# Patient Record
Sex: MALE | Race: BLACK OR AFRICAN AMERICAN | Employment: UNEMPLOYED | URBAN - METROPOLITAN AREA
[De-identification: names, ages, dates, MRNs, and addresses within clinical notes are randomized per-mention and may not be internally consistent; named-entity substitution may affect disease eponyms.]

---

## 2023-02-02 ENCOUNTER — HOSPITAL ENCOUNTER (EMERGENCY)
Age: 28
Discharge: HOME | End: 2023-02-02
Attending: EMERGENCY MEDICINE

## 2023-02-02 ENCOUNTER — APPOINTMENT (OUTPATIENT)
Dept: CT IMAGING | Age: 28
End: 2023-02-02

## 2023-02-02 VITALS
DIASTOLIC BLOOD PRESSURE: 93 MMHG | TEMPERATURE: 97.7 F | HEIGHT: 66 IN | BODY MASS INDEX: 27.64 KG/M2 | RESPIRATION RATE: 18 BRPM | SYSTOLIC BLOOD PRESSURE: 126 MMHG | OXYGEN SATURATION: 100 % | HEART RATE: 74 BPM | WEIGHT: 172 LBS

## 2023-02-02 DIAGNOSIS — F41.9 ANXIETY: Primary | ICD-10-CM

## 2023-02-02 LAB
ALBUMIN SERPL-MCNC: 4.2 G/DL (ref 3.5–5)
ALBUMIN/GLOB SERPL: 1 (ref 0.4–1.6)
ALP SERPL-CCNC: 69 U/L (ref 50–136)
ALT SERPL-CCNC: 22 U/L (ref 12–65)
AMPHET UR QL SCN: NEGATIVE
ANION GAP SERPL CALC-SCNC: 7 MMOL/L (ref 2–11)
APAP SERPL-MCNC: <2 UG/ML (ref 10–30)
AST SERPL-CCNC: 30 U/L (ref 15–37)
BARBITURATES UR QL SCN: NEGATIVE
BASOPHILS # BLD: 0 K/UL (ref 0–0.2)
BASOPHILS NFR BLD: 1 % (ref 0–2)
BENZODIAZ UR QL: NEGATIVE
BILIRUB SERPL-MCNC: 0.4 MG/DL (ref 0.2–1.1)
BUN SERPL-MCNC: 8 MG/DL (ref 6–23)
CALCIUM SERPL-MCNC: 9.3 MG/DL (ref 8.3–10.4)
CANNABINOIDS UR QL SCN: POSITIVE
CHLORIDE SERPL-SCNC: 109 MMOL/L (ref 101–110)
CK SERPL-CCNC: 211 U/L (ref 21–215)
CO2 SERPL-SCNC: 27 MMOL/L (ref 21–32)
COCAINE UR QL SCN: NEGATIVE
CREAT SERPL-MCNC: 1 MG/DL (ref 0.8–1.5)
DIFFERENTIAL METHOD BLD: ABNORMAL
EKG ATRIAL RATE: 78 BPM
EKG DIAGNOSIS: NORMAL
EKG P AXIS: 27 DEGREES
EKG P-R INTERVAL: 154 MS
EKG Q-T INTERVAL: 354 MS
EKG QRS DURATION: 92 MS
EKG QTC CALCULATION (BAZETT): 403 MS
EKG R AXIS: 42 DEGREES
EKG T AXIS: 25 DEGREES
EKG VENTRICULAR RATE: 78 BPM
EOSINOPHIL # BLD: 0.1 K/UL (ref 0–0.8)
EOSINOPHIL NFR BLD: 2 % (ref 0.5–7.8)
ERYTHROCYTE [DISTWIDTH] IN BLOOD BY AUTOMATED COUNT: 13.7 % (ref 11.9–14.6)
ETHANOL SERPL-MCNC: 119 MG/DL (ref 0–0.08)
GLOBULIN SER CALC-MCNC: 4.1 G/DL (ref 2.8–4.5)
GLUCOSE SERPL-MCNC: 84 MG/DL (ref 65–100)
HCT VFR BLD AUTO: 42.4 % (ref 41.1–50.3)
HGB BLD-MCNC: 14 G/DL (ref 13.6–17.2)
IMM GRANULOCYTES # BLD AUTO: 0 K/UL (ref 0–0.5)
IMM GRANULOCYTES NFR BLD AUTO: 0 % (ref 0–5)
LYMPHOCYTES # BLD: 2.8 K/UL (ref 0.5–4.6)
LYMPHOCYTES NFR BLD: 50 % (ref 13–44)
MAGNESIUM SERPL-MCNC: 2.3 MG/DL (ref 1.8–2.4)
MCH RBC QN AUTO: 30.4 PG (ref 26.1–32.9)
MCHC RBC AUTO-ENTMCNC: 33 G/DL (ref 31.4–35)
MCV RBC AUTO: 92 FL (ref 82–102)
METHADONE UR QL: NEGATIVE
MONOCYTES # BLD: 0.4 K/UL (ref 0.1–1.3)
MONOCYTES NFR BLD: 7 % (ref 4–12)
NEUTS SEG # BLD: 2.3 K/UL (ref 1.7–8.2)
NEUTS SEG NFR BLD: 40 % (ref 43–78)
NRBC # BLD: 0 K/UL (ref 0–0.2)
OPIATES UR QL: NEGATIVE
PCP UR QL: NEGATIVE
PLATELET # BLD AUTO: 302 K/UL (ref 150–450)
PMV BLD AUTO: 9.6 FL (ref 9.4–12.3)
POTASSIUM SERPL-SCNC: 4.3 MMOL/L (ref 3.5–5.1)
PROT SERPL-MCNC: 8.3 G/DL (ref 6.3–8.2)
RBC # BLD AUTO: 4.61 M/UL (ref 4.23–5.6)
SALICYLATES SERPL-MCNC: 3.2 MG/DL (ref 2.8–20)
SODIUM SERPL-SCNC: 143 MMOL/L (ref 133–143)
WBC # BLD AUTO: 5.7 K/UL (ref 4.3–11.1)

## 2023-02-02 PROCEDURE — 72125 CT NECK SPINE W/O DYE: CPT

## 2023-02-02 PROCEDURE — 82077 ASSAY SPEC XCP UR&BREATH IA: CPT

## 2023-02-02 PROCEDURE — 80307 DRUG TEST PRSMV CHEM ANLYZR: CPT

## 2023-02-02 PROCEDURE — 80143 DRUG ASSAY ACETAMINOPHEN: CPT

## 2023-02-02 PROCEDURE — 93005 ELECTROCARDIOGRAM TRACING: CPT

## 2023-02-02 PROCEDURE — 80053 COMPREHEN METABOLIC PANEL: CPT

## 2023-02-02 PROCEDURE — 80179 DRUG ASSAY SALICYLATE: CPT

## 2023-02-02 PROCEDURE — 83735 ASSAY OF MAGNESIUM: CPT

## 2023-02-02 PROCEDURE — 82550 ASSAY OF CK (CPK): CPT

## 2023-02-02 PROCEDURE — 85025 COMPLETE CBC W/AUTO DIFF WBC: CPT

## 2023-02-02 PROCEDURE — 70450 CT HEAD/BRAIN W/O DYE: CPT

## 2023-02-02 PROCEDURE — 99284 EMERGENCY DEPT VISIT MOD MDM: CPT

## 2023-02-02 RX ORDER — HYDROXYZINE HYDROCHLORIDE 25 MG/1
25 TABLET, FILM COATED ORAL EVERY 8 HOURS PRN
Qty: 10 TABLET | Refills: 0 | Status: SHIPPED | OUTPATIENT
Start: 2023-02-02 | End: 2023-02-12

## 2023-02-02 RX ORDER — HYDROXYZINE HYDROCHLORIDE 25 MG/1
25 TABLET, FILM COATED ORAL EVERY 8 HOURS PRN
Qty: 10 TABLET | Refills: 0 | Status: SHIPPED | OUTPATIENT
Start: 2023-02-02 | End: 2023-02-02 | Stop reason: SDUPTHER

## 2023-02-02 ASSESSMENT — PAIN - FUNCTIONAL ASSESSMENT: PAIN_FUNCTIONAL_ASSESSMENT: 0-10

## 2023-02-02 ASSESSMENT — LIFESTYLE VARIABLES
HOW OFTEN DO YOU HAVE A DRINK CONTAINING ALCOHOL: 4 OR MORE TIMES A WEEK
HOW MANY STANDARD DRINKS CONTAINING ALCOHOL DO YOU HAVE ON A TYPICAL DAY: 5 OR 6

## 2023-02-02 ASSESSMENT — PAIN SCALES - GENERAL: PAINLEVEL_OUTOF10: 0

## 2023-02-02 NOTE — ED PROVIDER NOTES
Emergency Department Provider Note                   PCP:                None None               Age: 32 y.o. Sex: male       ICD-10-CM    1. Anxiety  F41.9           DISPOSITION Decision To Discharge 02/02/2023 01:54:58 PM       Medical Decision Making  This patient is a 32year old male with no significant past medical history who presents today due to anxiety that occured yesterday when he was upset and subsequently passing out while unconscious and hitting his head outside. Differential diagnosis considered includes but is not limited to acute intracranial bleed, arrhythmia, anxiety. The patient is a well-appearing 43-year-old male who presents with his girlfriend. He says he has a mild frontal headache but denies all other symptoms at this time including anxiety. His headache was a 7/10 in severity but he denies needing pain medication. The patient's neurologic exam reveals no acute deficit. CT of his head and neck show no acute pathology. CBC and CMP are unremarkable. UDS is only positive for THC. EKG shows normal sinus rhythm. I discussed these results with the patient and his girlfriend. I gave the patient a prescription for Atarax to be used PRN for anxiety and stressed the importance of following up with either primary care or psychiatry for further evaluation and treatment of this patient's anxiety. The patient says that he is feeling better and is ready to go home on re-evaluation. We discussed return precautions. The patient and his girlfriend verbalize understanding and agreement with the plan. Problems Addressed:  Anxiety: acute illness or injury    Amount and/or Complexity of Data Reviewed  Labs: ordered. Radiology: ordered. ECG/medicine tests: ordered. Risk  Prescription drug management. Complexity of Problem: 1 stable, acute illness. (3)  The patients assessment required an independent historian: I spoke with a friend.   I have conducted an independent ordering and review of Labs. ED Course as of 02/03/23 2323   Thu Feb 02, 2023   1338 CT head IMPRESSION:  No acute intracranial abnormality. [KS]   2597    CT C spine IMPRESSION:  No acute traumatic abnormality of the cervical spine. [KS]   1339 EKG  Rate 78 bpm  No st segment elevation  Normal sinus rhythm [KS]   1344 UDS positive for THC. [KS]   5471 On reevaluation, the patient says that he does not feel anxious. He is ready to go home. We discussed the importance of following up with primary care in order to possibly be started on long-term anxiety medication we did. We also discussed possibly using hydroxyzine in the meantime in order to help with his panic attacks. [KS]      ED Course User Index  [KS] BERTRAND Morales        Orders Placed This Encounter   Procedures    CT HEAD WO CONTRAST    CT CERVICAL SPINE WO CONTRAST    CBC with Auto Differential    CMP    Acetaminophen Level    ETOH    Magnesium    Urine Drug Screen    CK    Salicylate    POCT Urine Dipstick    EKG 12 Lead        Medications - No data to display    Discharge Medication List as of 2/2/2023  2:19 PM        START taking these medications    Details   hydrOXYzine HCl (ATARAX) 25 MG tablet Take 1 tablet by mouth every 8 hours as needed for Itching, Disp-10 tablet, R-0Print              David Zapata is a 32 y.o. male who presents to the Emergency Department with chief complaint of    Chief Complaint   Patient presents with    Anxiety    Loss of Consciousness      This patient is a 32year old male with no significant past medical history who presents today due to anxiety that occured yesterday when he was upset. Last night the patient got in an argument with his sister, left the house, and passed out while walking outside. He says he hit his head does not know how long he was unconscious for and was intoxicated with alcohol at the time. He currently does not feel anxious but he has a 7 out of 10 headache.   His anxiety is worse after confrontation. The patient drinks a bottle of chardonnay daily. He has no thought of hurting himself or anyone else. The history is provided by the patient and a significant other. No  was used. Review of Systems   Constitutional:  Negative for chills and fatigue. HENT:  Negative for congestion. Eyes:  Negative for visual disturbance. Respiratory:  Negative for cough, chest tightness, shortness of breath and wheezing. Cardiovascular:  Negative for chest pain and leg swelling. Gastrointestinal:  Negative for abdominal distention, abdominal pain, constipation, nausea and vomiting. Genitourinary:  Negative for difficulty urinating, dysuria, flank pain and hematuria. Musculoskeletal:  Negative for back pain, neck pain and neck stiffness. Skin:  Negative for color change. Neurological:  Positive for headaches. Negative for dizziness, light-headedness and numbness. Psychiatric/Behavioral:  The patient is nervous/anxious. All other systems reviewed and are negative. History reviewed. No pertinent past medical history. Past Surgical History:   Procedure Laterality Date    ARM SURGERY      rods        History reviewed. No pertinent family history. Social History     Socioeconomic History    Marital status:      Spouse name: None    Number of children: None    Years of education: None    Highest education level: None   Tobacco Use    Smoking status: Every Day     Packs/day: 1.00     Types: Cigarettes    Smokeless tobacco: Never   Substance and Sexual Activity    Alcohol use: Yes     Comment: bottle of wine a day    Drug use: Yes     Types: Marijuana [place])        Allergies: Patient has no known allergies. Discharge Medication List as of 2/2/2023  2:19 PM           Vitals signs and nursing note reviewed. No data found. Physical Exam  Vitals and nursing note reviewed. Constitutional:       General: He is not in acute distress. Appearance: Normal appearance. He is not ill-appearing, toxic-appearing or diaphoretic. HENT:      Head: Normocephalic. Comments: No acute abnormality noticed on physical exam of the patient's head. No tenderness. Right Ear: Tympanic membrane, ear canal and external ear normal. There is no impacted cerumen. Left Ear: Tympanic membrane, ear canal and external ear normal. There is no impacted cerumen. Mouth/Throat:      Mouth: Mucous membranes are moist.      Pharynx: Oropharynx is clear. No oropharyngeal exudate or posterior oropharyngeal erythema. Eyes:      General: No scleral icterus. Right eye: No discharge. Left eye: No discharge. Extraocular Movements: Extraocular movements intact. Conjunctiva/sclera: Conjunctivae normal.      Pupils: Pupils are equal, round, and reactive to light. Cardiovascular:      Rate and Rhythm: Normal rate and regular rhythm. Pulses: Normal pulses. Heart sounds: Normal heart sounds. Pulmonary:      Effort: Pulmonary effort is normal. No respiratory distress. Breath sounds: Normal breath sounds. No wheezing or rales. Abdominal:      General: Abdomen is flat. There is no distension. Palpations: Abdomen is soft. Tenderness: There is no abdominal tenderness. There is no right CVA tenderness, left CVA tenderness or guarding. Musculoskeletal:         General: No swelling, tenderness or signs of injury. Normal range of motion. Cervical back: Normal range of motion and neck supple. No rigidity or tenderness. Skin:     General: Skin is warm and dry. Capillary Refill: Capillary refill takes less than 2 seconds. Coloration: Skin is not jaundiced. Findings: No bruising. Neurological:      General: No focal deficit present. Mental Status: He is alert and oriented to person, place, and time. Mental status is at baseline. Cranial Nerves: No cranial nerve deficit.       Sensory: No sensory deficit. Motor: No weakness. Psychiatric:         Mood and Affect: Mood normal.         Behavior: Behavior normal.         Thought Content: Thought content normal.         Judgment: Judgment normal.        Procedures      Is this patient to be included in the SEP-1 core measure due to severe sepsis or septic shock? No Exclusion criteria - the patient is NOT to be included for SEP-1 Core Measure due to: Infection is not suspected     Results for orders placed or performed during the hospital encounter of 02/02/23   CT HEAD WO CONTRAST    Narrative    EXAM: CT HEAD WITHOUT CONTRAST    INDICATION: headache after fall last night while intoxicated. COMPARISON: None. TECHNIQUE: Contiguous axial images were obtained from the skull base through the  vertex without IV contrast. Radiation dose reduction techniques were used for  this study. Our CT scanners use one or all of the following: Automated exposure  control, adjustment of the mA and/or kV according to patient size, iterative  reconstruction. FINDINGS:   Normal appearance of the brain parenchyma without evidence of acute infarction,  hemorrhage, or mass lesion. No hydrocephalus or midline shift. No extra-axial  mass or hemorrhage. The basal cisterns are patent. The visualized portions of the orbits are normal. The mastoid air cells and  paranasal sinuses are patent. The visualized vascular structures have an unremarkable noncontrast appearance. The calvarium and soft tissues appear normal.      Impression    No acute intracranial abnormality. CT CERVICAL SPINE WO CONTRAST    Narrative    CT CERVICAL SPINE    INDICATION: Fall    COMPARISON: None. TECHNIQUE: Contiguous axial images from the skull base through the superior  mediastinum were obtained with coronal and sagittal reformations. Radiation dose  reduction techniques were used for this study.  Our CT scanners use one or all of  the following: Automated exposure control, adjustment of the mA and/or kV  according to patient size, iterative reconstruction. FINDINGS:  Bones: Vertebral body heights are maintained and alignment is normal.    Intervertebral discs: Disc spaces are maintained. Soft tissues: No prevertebral soft tissue swelling. Impression    No acute traumatic abnormality of the cervical spine.        CBC with Auto Differential   Result Value Ref Range    WBC 5.7 4.3 - 11.1 K/uL    RBC 4.61 4.23 - 5.6 M/uL    Hemoglobin 14.0 13.6 - 17.2 g/dL    Hematocrit 42.4 41.1 - 50.3 %    MCV 92.0 82 - 102 FL    MCH 30.4 26.1 - 32.9 PG    MCHC 33.0 31.4 - 35.0 g/dL    RDW 13.7 11.9 - 14.6 %    Platelets 221 176 - 174 K/uL    MPV 9.6 9.4 - 12.3 FL    nRBC 0.00 0.0 - 0.2 K/uL    Differential Type AUTOMATED      Seg Neutrophils 40 (L) 43 - 78 %    Lymphocytes 50 (H) 13 - 44 %    Monocytes 7 4.0 - 12.0 %    Eosinophils % 2 0.5 - 7.8 %    Basophils 1 0.0 - 2.0 %    Immature Granulocytes 0 0.0 - 5.0 %    Segs Absolute 2.3 1.7 - 8.2 K/UL    Absolute Lymph # 2.8 0.5 - 4.6 K/UL    Absolute Mono # 0.4 0.1 - 1.3 K/UL    Absolute Eos # 0.1 0.0 - 0.8 K/UL    Basophils Absolute 0.0 0.0 - 0.2 K/UL    Absolute Immature Granulocyte 0.0 0.0 - 0.5 K/UL   CMP   Result Value Ref Range    Sodium 143 133 - 143 mmol/L    Potassium 4.3 3.5 - 5.1 mmol/L    Chloride 109 101 - 110 mmol/L    CO2 27 21 - 32 mmol/L    Anion Gap 7 2 - 11 mmol/L    Glucose 84 65 - 100 mg/dL    BUN 8 6 - 23 MG/DL    Creatinine 1.00 0.8 - 1.5 MG/DL    Est, Glom Filt Rate >60 >60 ml/min/1.73m2    Calcium 9.3 8.3 - 10.4 MG/DL    Total Bilirubin 0.4 0.2 - 1.1 MG/DL    ALT 22 12 - 65 U/L    AST 30 15 - 37 U/L    Alk Phosphatase 69 50 - 136 U/L    Total Protein 8.3 (H) 6.3 - 8.2 g/dL    Albumin 4.2 3.5 - 5.0 g/dL    Globulin 4.1 2.8 - 4.5 g/dL    Albumin/Globulin Ratio 1.0 0.4 - 1.6     Acetaminophen Level   Result Value Ref Range    Acetaminophen Level <2 (L) 10.0 - 30.0 ug/mL   ETOH   Result Value Ref Range    Ethanol Lvl 119 MG/DL Magnesium   Result Value Ref Range    Magnesium 2.3 1.8 - 2.4 mg/dL   Urine Drug Screen   Result Value Ref Range    PCP, Urine Negative NEG      Benzodiazepines, Urine Negative NEG      Cocaine, Urine Negative NEG      Amphetamine, Urine Negative NEG      Methadone, Urine Negative NEG      THC, TH-Cannabinol, Urine Positive (A) NEG      Opiates, Urine Negative NEG      Barbiturates, Urine Negative NEG     CK   Result Value Ref Range    Total  21 - 694 U/L   Salicylate   Result Value Ref Range    Salicylate, Serum 3.2 2.8 - 20.0 MG/DL   EKG 12 Lead   Result Value Ref Range    Ventricular Rate 78 BPM    Atrial Rate 78 BPM    P-R Interval 154 ms    QRS Duration 92 ms    Q-T Interval 354 ms    QTc Calculation (Bazett) 403 ms    P Axis 27 degrees    R Axis 42 degrees    T Axis 25 degrees    Diagnosis Normal sinus rhythm with sinus arrhythmia         CT HEAD WO CONTRAST   Final Result   No acute intracranial abnormality. CT CERVICAL SPINE WO CONTRAST   Final Result   No acute traumatic abnormality of the cervical spine. Voice dictation software was used during the making of this note. This software is not perfect and grammatical and other typographical errors may be present. This note has not been completely proofread for errors.        Kareem Britton [facility]  02/03/23 [id number]

## 2023-02-02 NOTE — Clinical Note
Catina Faria was seen and treated in our emergency department on 2/2/2023. He may return to work on 02/03/2023. If you have any questions or concerns, please don't hesitate to call.       BERTRAND Celeste

## 2023-02-02 NOTE — ED NOTES
I have reviewed discharge instructions with the patient. The patient verbalized understanding. Patient left ED via Discharge Method: ambulatory to Home with friend. Opportunity for questions and clarification provided. Patient given 1 scripts. To continue your aftercare when you leave the hospital, you may receive an automated call from our care team to check in on how you are doing. This is a free service and part of our promise to provide the best care and service to meet your aftercare needs.  If you have questions, or wish to unsubscribe from this service please call [phone]. Thank you for Choosing our [facility] Emergency Department.        Claribel Ortiz RN  02/02/23 [id number]

## 2023-02-02 NOTE — DISCHARGE INSTRUCTIONS
Please make an appointment with a primary care doctor in order to discuss your anxiety. If you have any new or worsening symptoms, please return here for further evaluation. Please also discuss your alcohol use with primary care. We would love to help you get a primary care doctor for follow-up after your emergency department visit. Please call [phone] between 7AM - 6PM Monday to Friday. A care navigator will be able to assist you with setting up a doctor close to your home.

## 2023-02-02 NOTE — ED TRIAGE NOTES
Pt reports anxiety attack (dyspnea and shaking) yesterday morning and today, only after \"when I get upset\" but denies at this time.  Pt endorses fall last night w head strike, reports syncope r/t anxiety with unknown length of LOC. (+)headache, drowsy (-)n/v/d, dyspnea  Daily ETOH, a bottle of wine daily w no history of withdrawal.  Pt denies Hx of anxiety diagnosis   A&Ox4

## 2023-02-03 ASSESSMENT — ENCOUNTER SYMPTOMS
ABDOMINAL DISTENTION: 0
VOMITING: 0
BACK PAIN: 0
ABDOMINAL PAIN: 0
COLOR CHANGE: 0
SHORTNESS OF BREATH: 0
WHEEZING: 0
CONSTIPATION: 0
CHEST TIGHTNESS: 0
COUGH: 0
NAUSEA: 0